# Patient Record
Sex: MALE | Race: WHITE | Employment: FULL TIME | URBAN - METROPOLITAN AREA
[De-identification: names, ages, dates, MRNs, and addresses within clinical notes are randomized per-mention and may not be internally consistent; named-entity substitution may affect disease eponyms.]

---

## 2017-08-31 ENCOUNTER — OFFICE VISIT (OUTPATIENT)
Dept: FAMILY MEDICINE CLINIC | Age: 47
End: 2017-08-31

## 2017-08-31 VITALS
SYSTOLIC BLOOD PRESSURE: 132 MMHG | DIASTOLIC BLOOD PRESSURE: 76 MMHG | BODY MASS INDEX: 29.54 KG/M2 | WEIGHT: 183 LBS | OXYGEN SATURATION: 98 % | TEMPERATURE: 97.3 F | HEART RATE: 100 BPM

## 2017-08-31 DIAGNOSIS — J06.9 VIRAL URI: Primary | ICD-10-CM

## 2017-08-31 PROCEDURE — 99213 OFFICE O/P EST LOW 20 MIN: CPT | Performed by: FAMILY MEDICINE

## 2017-08-31 RX ORDER — BENZONATATE 200 MG/1
200 CAPSULE ORAL 3 TIMES DAILY PRN
Qty: 30 CAPSULE | Refills: 0 | Status: SHIPPED | OUTPATIENT
Start: 2017-08-31

## 2017-08-31 RX ORDER — PREDNISONE 20 MG/1
40 TABLET ORAL DAILY
Qty: 10 TABLET | Refills: 0 | Status: SHIPPED | OUTPATIENT
Start: 2017-08-31

## 2017-08-31 ASSESSMENT — PATIENT HEALTH QUESTIONNAIRE - PHQ9
SUM OF ALL RESPONSES TO PHQ9 QUESTIONS 1 & 2: 0
SUM OF ALL RESPONSES TO PHQ QUESTIONS 1-9: 0
2. FEELING DOWN, DEPRESSED OR HOPELESS: 0
1. LITTLE INTEREST OR PLEASURE IN DOING THINGS: 0

## 2017-09-06 ENCOUNTER — TELEPHONE (OUTPATIENT)
Dept: FAMILY MEDICINE CLINIC | Age: 47
End: 2017-09-06

## 2017-09-07 RX ORDER — SULFAMETHOXAZOLE AND TRIMETHOPRIM 800; 160 MG/1; MG/1
1 TABLET ORAL 2 TIMES DAILY
Qty: 20 TABLET | Refills: 0 | Status: SHIPPED | OUTPATIENT
Start: 2017-09-07 | End: 2017-09-17

## 2024-05-30 ENCOUNTER — HOSPITAL ENCOUNTER (OUTPATIENT)
Dept: SLEEP CENTER | Age: 54
Discharge: HOME | End: 2024-05-30
Payer: OTHER GOVERNMENT

## 2024-05-30 VITALS
WEIGHT: 165 LBS | OXYGEN SATURATION: 97 % | HEIGHT: 65 IN | BODY MASS INDEX: 27.49 KG/M2 | DIASTOLIC BLOOD PRESSURE: 83 MMHG | SYSTOLIC BLOOD PRESSURE: 151 MMHG | HEART RATE: 92 BPM

## 2024-05-30 DIAGNOSIS — F17.210 CIGARETTE SMOKER: ICD-10-CM

## 2024-05-30 DIAGNOSIS — E66.3 OVERWEIGHT (BMI 25.0-29.9): ICD-10-CM

## 2024-05-30 DIAGNOSIS — G47.10 HYPERSOMNIA: ICD-10-CM

## 2024-05-30 DIAGNOSIS — G47.33 OSA (OBSTRUCTIVE SLEEP APNEA): Primary | ICD-10-CM

## 2024-05-30 PROCEDURE — 99211 OFF/OP EST MAY X REQ PHY/QHP: CPT

## 2024-05-30 PROCEDURE — 99204 OFFICE O/P NEW MOD 45 MIN: CPT | Performed by: INTERNAL MEDICINE

## 2024-05-30 RX ORDER — MIRTAZAPINE 15 MG/1
15 TABLET, FILM COATED ORAL
COMMUNITY
Start: 2024-05-09

## 2024-05-30 RX ORDER — GABAPENTIN 600 MG/1
600 TABLET ORAL 4 TIMES DAILY
COMMUNITY

## 2024-05-30 ASSESSMENT — SLEEP AND FATIGUE QUESTIONNAIRES
ESS TOTAL SCORE: 17
HOW LIKELY ARE YOU TO NOD OFF OR FALL ASLEEP WHILE SITTING AND TALKING TO SOMEONE: SLIGHT CHANCE OF DOZING
HOW LIKELY ARE YOU TO NOD OFF OR FALL ASLEEP IN A CAR, WHILE STOPPED FOR A FEW MINUTES IN TRAFFIC: SLIGHT CHANCE OF DOZING
HOW LIKELY ARE YOU TO NOD OFF OR FALL ASLEEP WHILE LYING DOWN TO REST IN THE AFTERNOON WHEN CIRCUMSTANCES PERMIT: HIGH CHANCE OF DOZING
HOW LIKELY ARE YOU TO NOD OFF OR FALL ASLEEP WHILE WATCHING TV: HIGH CHANCE OF DOZING
HOW LIKELY ARE YOU TO NOD OFF OR FALL ASLEEP WHEN YOU ARE A PASSENGER IN A CAR FOR AN HOUR WITHOUT A BREAK: MODERATE CHANCE OF DOZING
HOW LIKELY ARE YOU TO NOD OFF OR FALL ASLEEP WHILE SITTING AND READING: HIGH CHANCE OF DOZING
HOW LIKELY ARE YOU TO NOD OFF OR FALL ASLEEP WHILE SITTING INACTIVE IN A PUBLIC PLACE: MODERATE CHANCE OF DOZING
HOW LIKELY ARE YOU TO NOD OFF OR FALL ASLEEP WHILE SITTING QUIETLY AFTER LUNCH WITHOUT ALCOHOL: MODERATE CHANCE OF DOZING

## 2024-05-30 NOTE — CONSULTS
Advised to quit smoking         Relevant Orders    Full PFT Study With Bronchodilator             Additional Plan:     [x]  Sleep hygiene/ relaxation methods & CBTi principles review with patient   [x]  Avoid supine/back sleep until sleep study   [x]  Driving precautions   [x]  Medical consequences of untreated RUPERT   [x]  Weight loss recommendations   [x]  Diet recommendations   [x]  Exercise   [x]  Advised to quit smoking       []  PFT referral   []  Bariatric Program referral    Total time spent for this encounter: 45 mins    Follow-Up:    No follow-ups on file.    Electronically signed by Babak Funez MD on 5/30/2024 at 10:20 AM

## 2024-06-08 ENCOUNTER — HOSPITAL ENCOUNTER (OUTPATIENT)
Dept: SLEEP CENTER | Age: 54
Discharge: HOME | End: 2024-06-08
Payer: OTHER GOVERNMENT

## 2024-06-08 DIAGNOSIS — G47.33 OSA (OBSTRUCTIVE SLEEP APNEA): ICD-10-CM

## 2024-06-08 PROCEDURE — 95811 POLYSOM 6/>YRS CPAP 4/> PARM: CPT

## 2024-06-18 ENCOUNTER — HOSPITAL ENCOUNTER (OUTPATIENT)
Dept: PULMONOLOGY | Age: 54
Discharge: HOME | End: 2024-06-18
Attending: INTERNAL MEDICINE
Payer: OTHER GOVERNMENT

## 2024-06-18 DIAGNOSIS — F17.210 CIGARETTE SMOKER: ICD-10-CM

## 2024-06-18 LAB
DLCO %PRED: 145 %
DLCO PRED: NORMAL
DLCO/VA %PRED: 129 %
DLCO/VA PRED: NORMAL
DLCO/VA: NORMAL
DLCO: NORMAL
EXPIRATORY TIME-POST: NORMAL
EXPIRATORY TIME: NORMAL
FEF 25-75 %CHNG: NORMAL
FEF 25-75 POST %PRED: 177 %
FEF 25-75% %PRED-PRE: 176 L/SEC
FEF 25-75% PRED: NORMAL
FEF 25-75-POST: NORMAL
FEF 25-75-PRE: NORMAL
FEV1 %PRED-POST: 139 %
FEV1 %PRED-PRE: 138 %
FEV1 PRED: NORMAL
FEV1-POST: NORMAL
FEV1-PRE: NORMAL
FEV1/FVC %PRED-POST: 106 %
FEV1/FVC %PRED-PRE: 105 %
FEV1/FVC PRED: NORMAL
FEV1/FVC-POST: NORMAL
FEV1/FVC-PRE: NORMAL
FVC %PRED-POST: 131 L
FVC %PRED-PRE: 131 %
FVC PRED: NORMAL
FVC-POST: NORMAL
FVC-PRE: NORMAL
GAW %PRED: NORMAL
GAW PRED: NORMAL
GAW: NORMAL
IC PRE %PRED: 113 %
IC PRED: NORMAL
IC: NORMAL
MEP: NORMAL
MIP: NORMAL
MVV %PRED-PRE: NORMAL
MVV PRED: NORMAL
MVV-PRE: NORMAL
PEF %PRED-POST: NORMAL
PEF %PRED-PRE: NORMAL
PEF PRED: NORMAL
PEF%CHNG: NORMAL
PEF-POST: NORMAL
PEF-PRE: NORMAL
RAW %PRED: NORMAL
RAW PRED: NORMAL
RAW: NORMAL
RV PRE %PRED: 110 %
RV PRED: NORMAL
RV: NORMAL
SVC %PRED: 129 %
SVC PRED: NORMAL
SVC: NORMAL
TLC PRE %PRED: 115 %
TLC PRED: NORMAL
TLC: NORMAL
VA %PRED: 112 %
VA PRED: NORMAL
VA: NORMAL
VTG %PRED: NORMAL
VTG PRED: NORMAL
VTG: NORMAL

## 2024-06-18 PROCEDURE — 94060 EVALUATION OF WHEEZING: CPT

## 2024-06-18 PROCEDURE — 94727 GAS DIL/WSHOT DETER LNG VOL: CPT

## 2024-06-18 PROCEDURE — 94729 DIFFUSING CAPACITY: CPT

## 2024-06-18 ASSESSMENT — PULMONARY FUNCTION TESTS
FEV1_PERCENT_PREDICTED_POST: 139
FEV1_PERCENT_PREDICTED_PRE: 138
FEV1/FVC_PERCENT_PREDICTED_POST: 106
FEV1/FVC_PERCENT_PREDICTED_PRE: 105
FVC_PERCENT_PREDICTED_POST: 131
FVC_PERCENT_PREDICTED_PRE: 131

## 2024-09-26 ENCOUNTER — HOSPITAL ENCOUNTER (OUTPATIENT)
Dept: SLEEP CENTER | Age: 54
Discharge: HOME | End: 2024-09-26
Payer: OTHER GOVERNMENT

## 2024-09-26 DIAGNOSIS — F17.210 CIGARETTE SMOKER: ICD-10-CM

## 2024-09-26 DIAGNOSIS — G47.10 HYPERSOMNIA: ICD-10-CM

## 2024-09-26 DIAGNOSIS — E66.3 OVERWEIGHT (BMI 25.0-29.9): ICD-10-CM

## 2024-09-26 DIAGNOSIS — G47.31 COMPLEX SLEEP APNEA SYNDROME: Primary | ICD-10-CM

## 2024-09-26 PROBLEM — G47.39 COMPLEX SLEEP APNEA SYNDROME: Status: ACTIVE | Noted: 2024-09-26

## 2024-09-26 PROCEDURE — 99211 OFF/OP EST MAY X REQ PHY/QHP: CPT

## 2024-09-26 PROCEDURE — 99215 OFFICE O/P EST HI 40 MIN: CPT | Performed by: INTERNAL MEDICINE

## 2024-09-26 ASSESSMENT — ENCOUNTER SYMPTOMS
SHORTNESS OF BREATH: 0
EYE ITCHING: 0
COUGH: 0
BACK PAIN: 0
ABDOMINAL PAIN: 0
ABDOMINAL DISTENTION: 0
EYE DISCHARGE: 0

## 2024-10-29 ENCOUNTER — OFFICE VISIT (OUTPATIENT)
Dept: PULMONOLOGY | Age: 54
End: 2024-10-29
Payer: OTHER GOVERNMENT

## 2024-10-29 VITALS
HEIGHT: 65 IN | DIASTOLIC BLOOD PRESSURE: 82 MMHG | SYSTOLIC BLOOD PRESSURE: 118 MMHG | OXYGEN SATURATION: 97 % | HEART RATE: 102 BPM | WEIGHT: 173 LBS | BODY MASS INDEX: 28.82 KG/M2

## 2024-10-29 DIAGNOSIS — G47.39 COMPLEX SLEEP APNEA SYNDROME: Primary | ICD-10-CM

## 2024-10-29 DIAGNOSIS — G47.10 HYPERSOMNIA: ICD-10-CM

## 2024-10-29 DIAGNOSIS — F17.210 CIGARETTE SMOKER: ICD-10-CM

## 2024-10-29 DIAGNOSIS — R09.82 POST-NASAL DRIP: ICD-10-CM

## 2024-10-29 PROCEDURE — 99215 OFFICE O/P EST HI 40 MIN: CPT | Performed by: INTERNAL MEDICINE

## 2024-10-29 RX ORDER — MOMETASONE FUROATE MONOHYDRATE 50 UG/1
2 SPRAY, METERED NASAL DAILY
Qty: 1 EACH | Refills: 4 | Status: SHIPPED | OUTPATIENT
Start: 2024-10-29

## 2024-10-29 RX ORDER — SILDENAFIL 100 MG/1
50 TABLET, FILM COATED ORAL PRN
COMMUNITY
Start: 2024-09-10

## 2024-10-29 ASSESSMENT — ENCOUNTER SYMPTOMS
ABDOMINAL PAIN: 0
BACK PAIN: 0
SHORTNESS OF BREATH: 0
EYE ITCHING: 0
EYE DISCHARGE: 0
ABDOMINAL DISTENTION: 0
COUGH: 0

## 2024-10-29 NOTE — PROGRESS NOTES
Musculoskeletal:         General: Normal range of motion.      Cervical back: Normal range of motion and neck supple.   Skin:     General: Skin is warm and dry.   Neurological:      General: No focal deficit present.      Mental Status: He is alert and oriented to person, place, and time.   Psychiatric:         Mood and Affect: Mood normal.         Behavior: Behavior normal.         Radiology: None    Assessment and Plan     Problem List             Diagnosed       Respiratory    Complex sleep apnea syndrome - Primary        Advised to be compliant with the ASV  Loose weight  Need 2 week download data            Other    Hypersomnia        Advised to be compliant with the ASV  Loose weight  Need 2 week download data         Cigarette smoker        Advised to quit smoking  PFT  Low Dose CT chest in 1 year         Relevant Orders    Full PFT Study With Bronchodilator    CT LUNG SCREENING    Post-nasal drip       Anti histamine  Nasonex  Saline nasal spray                 Total time spent for this encounter: 40 mins    Follow-Up:    Return in about 1 year (around 10/29/2025) for 2 week download data, PFT, Low dose CT chest.     Progress notes sent to the referring Provider    Babak Funez MD MD  10/29/2024  2:23 PM